# Patient Record
Sex: FEMALE | HISPANIC OR LATINO | Employment: OTHER | URBAN - METROPOLITAN AREA
[De-identification: names, ages, dates, MRNs, and addresses within clinical notes are randomized per-mention and may not be internally consistent; named-entity substitution may affect disease eponyms.]

---

## 2017-07-31 ENCOUNTER — HOSPITAL ENCOUNTER (EMERGENCY)
Facility: HOSPITAL | Age: 72
Discharge: HOME | End: 2017-07-31
Attending: EMERGENCY MEDICINE | Admitting: EMERGENCY MEDICINE

## 2017-07-31 VITALS
RESPIRATION RATE: 16 BRPM | WEIGHT: 144 LBS | OXYGEN SATURATION: 97 % | BODY MASS INDEX: 27.19 KG/M2 | DIASTOLIC BLOOD PRESSURE: 73 MMHG | HEART RATE: 87 BPM | TEMPERATURE: 98.1 F | SYSTOLIC BLOOD PRESSURE: 140 MMHG | HEIGHT: 61 IN

## 2017-07-31 DIAGNOSIS — Z86.73 HISTORY OF CVA (CEREBROVASCULAR ACCIDENT): Primary | ICD-10-CM

## 2017-07-31 LAB
ALBUMIN SERPL-MCNC: 4 G/DL (ref 3.5–5.2)
ALBUMIN/GLOB SERPL: 1 G/DL
ALP SERPL-CCNC: 79 U/L (ref 39–117)
ALT SERPL W P-5'-P-CCNC: 18 U/L (ref 1–33)
ANION GAP SERPL CALCULATED.3IONS-SCNC: 16.2 MMOL/L
AST SERPL-CCNC: 17 U/L (ref 1–32)
BASOPHILS # BLD AUTO: 0.03 10*3/MM3 (ref 0–0.2)
BASOPHILS NFR BLD AUTO: 0.3 % (ref 0–1.5)
BILIRUB SERPL-MCNC: 0.4 MG/DL (ref 0.1–1.2)
BUN BLD-MCNC: 14 MG/DL (ref 8–23)
BUN/CREAT SERPL: 17.5 (ref 7–25)
CALCIUM SPEC-SCNC: 9.8 MG/DL (ref 8.6–10.5)
CHLORIDE SERPL-SCNC: 98 MMOL/L (ref 98–107)
CO2 SERPL-SCNC: 25.8 MMOL/L (ref 22–29)
CREAT BLD-MCNC: 0.8 MG/DL (ref 0.57–1)
DEPRECATED RDW RBC AUTO: 50 FL (ref 37–54)
EOSINOPHIL # BLD AUTO: 0.04 10*3/MM3 (ref 0–0.7)
EOSINOPHIL NFR BLD AUTO: 0.4 % (ref 0.3–6.2)
ERYTHROCYTE [DISTWIDTH] IN BLOOD BY AUTOMATED COUNT: 14.2 % (ref 11.7–13)
GFR SERPL CREATININE-BSD FRML MDRD: 71 ML/MIN/1.73
GLOBULIN UR ELPH-MCNC: 4.1 GM/DL
GLUCOSE BLD-MCNC: 153 MG/DL (ref 65–99)
HCT VFR BLD AUTO: 39.7 % (ref 35.6–45.5)
HGB BLD-MCNC: 12.7 G/DL (ref 11.9–15.5)
HOLD SPECIMEN: NORMAL
HOLD SPECIMEN: NORMAL
IMM GRANULOCYTES # BLD: 0.03 10*3/MM3 (ref 0–0.03)
IMM GRANULOCYTES NFR BLD: 0.3 % (ref 0–0.5)
INR PPP: 1.41 (ref 0.9–1.1)
LYMPHOCYTES # BLD AUTO: 2.51 10*3/MM3 (ref 0.9–4.8)
LYMPHOCYTES NFR BLD AUTO: 25.3 % (ref 19.6–45.3)
MCH RBC QN AUTO: 31 PG (ref 26.9–32)
MCHC RBC AUTO-ENTMCNC: 32 G/DL (ref 32.4–36.3)
MCV RBC AUTO: 96.8 FL (ref 80.5–98.2)
MONOCYTES # BLD AUTO: 0.51 10*3/MM3 (ref 0.2–1.2)
MONOCYTES NFR BLD AUTO: 5.1 % (ref 5–12)
NEUTROPHILS # BLD AUTO: 6.8 10*3/MM3 (ref 1.9–8.1)
NEUTROPHILS NFR BLD AUTO: 68.6 % (ref 42.7–76)
PLATELET # BLD AUTO: 398 10*3/MM3 (ref 140–500)
PMV BLD AUTO: 12.1 FL (ref 6–12)
POTASSIUM BLD-SCNC: 3.5 MMOL/L (ref 3.5–5.2)
PROT SERPL-MCNC: 8.1 G/DL (ref 6–8.5)
PROTHROMBIN TIME: 16.7 SECONDS (ref 11.7–14.2)
RBC # BLD AUTO: 4.1 10*6/MM3 (ref 3.9–5.2)
SODIUM BLD-SCNC: 140 MMOL/L (ref 136–145)
WBC NRBC COR # BLD: 9.92 10*3/MM3 (ref 4.5–10.7)
WHOLE BLOOD HOLD SPECIMEN: NORMAL
WHOLE BLOOD HOLD SPECIMEN: NORMAL

## 2017-07-31 PROCEDURE — 85610 PROTHROMBIN TIME: CPT

## 2017-07-31 PROCEDURE — 85025 COMPLETE CBC W/AUTO DIFF WBC: CPT | Performed by: EMERGENCY MEDICINE

## 2017-07-31 PROCEDURE — 80053 COMPREHEN METABOLIC PANEL: CPT | Performed by: EMERGENCY MEDICINE

## 2017-07-31 PROCEDURE — 99284 EMERGENCY DEPT VISIT MOD MDM: CPT

## 2017-07-31 PROCEDURE — 36415 COLL VENOUS BLD VENIPUNCTURE: CPT

## 2017-07-31 RX ORDER — FUROSEMIDE 40 MG/1
40 TABLET ORAL 2 TIMES DAILY
COMMUNITY

## 2017-07-31 RX ORDER — CLOPIDOGREL BISULFATE 75 MG/1
75 TABLET ORAL DAILY
Qty: 30 TABLET | Refills: 0 | Status: SHIPPED | OUTPATIENT
Start: 2017-07-31 | End: 2017-08-31 | Stop reason: SDUPTHER

## 2017-07-31 RX ORDER — ATORVASTATIN CALCIUM 20 MG/1
20 TABLET, FILM COATED ORAL DAILY
COMMUNITY
End: 2017-08-21

## 2017-07-31 RX ORDER — SODIUM CHLORIDE 0.9 % (FLUSH) 0.9 %
10 SYRINGE (ML) INJECTION AS NEEDED
Status: DISCONTINUED | OUTPATIENT
Start: 2017-07-31 | End: 2017-07-31 | Stop reason: HOSPADM

## 2017-07-31 RX ORDER — PREDNISONE 20 MG/1
5 TABLET ORAL DAILY
COMMUNITY
End: 2017-08-14

## 2017-07-31 RX ORDER — ORPHENADRINE CITRATE 100 MG/1
100 TABLET, EXTENDED RELEASE ORAL 2 TIMES DAILY
COMMUNITY
End: 2017-08-14

## 2017-07-31 RX ORDER — LEVOTHYROXINE SODIUM 0.1 MG/1
100 TABLET ORAL DAILY
COMMUNITY
End: 2017-08-21

## 2017-07-31 RX ORDER — LOSARTAN POTASSIUM AND HYDROCHLOROTHIAZIDE 25; 100 MG/1; MG/1
1 TABLET ORAL DAILY
COMMUNITY

## 2017-08-14 ENCOUNTER — OFFICE VISIT (OUTPATIENT)
Dept: FAMILY MEDICINE CLINIC | Facility: CLINIC | Age: 72
End: 2017-08-14

## 2017-08-14 VITALS
SYSTOLIC BLOOD PRESSURE: 124 MMHG | TEMPERATURE: 97.7 F | BODY MASS INDEX: 26.28 KG/M2 | OXYGEN SATURATION: 98 % | HEART RATE: 89 BPM | HEIGHT: 61 IN | WEIGHT: 139.2 LBS | DIASTOLIC BLOOD PRESSURE: 78 MMHG

## 2017-08-14 DIAGNOSIS — G81.94 LEFT HEMIPARESIS (HCC): ICD-10-CM

## 2017-08-14 DIAGNOSIS — E03.9 HYPOTHYROIDISM, UNSPECIFIED TYPE: ICD-10-CM

## 2017-08-14 DIAGNOSIS — M81.0 OSTEOPOROSIS: ICD-10-CM

## 2017-08-14 DIAGNOSIS — Z78.0 POSTMENOPAUSAL: ICD-10-CM

## 2017-08-14 DIAGNOSIS — J84.9 INTERSTITIAL PNEUMONIA (HCC): Primary | ICD-10-CM

## 2017-08-14 DIAGNOSIS — I10 ESSENTIAL HYPERTENSION: ICD-10-CM

## 2017-08-14 DIAGNOSIS — S32.000S COMPRESSION FRACTURE OF LUMBAR VERTEBRA, SEQUELA: ICD-10-CM

## 2017-08-14 DIAGNOSIS — I63.9 CEREBROVASCULAR ACCIDENT (CVA), UNSPECIFIED MECHANISM (HCC): ICD-10-CM

## 2017-08-14 DIAGNOSIS — E11.9 DIABETES MELLITUS WITHOUT COMPLICATION (HCC): ICD-10-CM

## 2017-08-14 DIAGNOSIS — E78.5 HYPERLIPIDEMIA, UNSPECIFIED HYPERLIPIDEMIA TYPE: ICD-10-CM

## 2017-08-14 PROBLEM — S32.000A COMPRESSION FRACTURE OF LUMBAR VERTEBRA (HCC): Status: ACTIVE | Noted: 2017-08-14

## 2017-08-14 PROCEDURE — 99204 OFFICE O/P NEW MOD 45 MIN: CPT | Performed by: NURSE PRACTITIONER

## 2017-08-14 RX ORDER — PREDNISONE 1 MG/1
5 TABLET ORAL DAILY
Qty: 30 TABLET | Refills: 1 | Status: SHIPPED | OUTPATIENT
Start: 2017-08-14

## 2017-08-14 RX ORDER — LIDOCAINE 50 MG/G
1 PATCH TOPICAL EVERY 24 HOURS
Qty: 30 PATCH | Refills: 5 | Status: SHIPPED | OUTPATIENT
Start: 2017-08-14 | End: 2017-08-21

## 2017-08-14 RX ORDER — ALENDRONATE SODIUM 70 MG/1
70 TABLET ORAL
Qty: 4 TABLET | Refills: 5
Start: 2017-08-14

## 2017-08-14 NOTE — PROGRESS NOTES
"Subjective   Shyla Garrison is a 71 y.o. female.     History of Present Illness     New pt.  She is from [place], speaks no English.  Here with her daughter who serves as .    [place]  Daughter went to [place] and brought her back to [place] to live with her.  Pt was seen at [facility] ED on 7/31 with c/o's weakness, chest pain and weight loss.  Neg w/u.  They did d/c the warfarin she was on and changed her to Plavix.  Daughter states pt had PE in [place].  But, now daughter is unsure if pt really had any PEs.  She was neg for DVT.  She does not have A-fib.  Appt schedule with neuro in December with Hao. Daughter wants neurologist [facility]  Daughter has been working from home to take care of her mother.  Needs [facility] paperwork completed so she can care for patient and transport to/from medical appts.  Pt has left sided weakness.  Deficit is improving.  But, still requires full care at home and has difficulty leaving home.      Hx interstitial pneumonia.  Has been on prednisone for 2 years.  A month ago dose decreased from 20 mg daily to 5 mg daily.  She has tolerated change.  Never required home O2.  Daughter states pt does fatigue easily with activities.    Hx DM since taking daily prednisone.  Controlled with metformin.      HLD:  Has been on atorvastatin for some time.  No fasting today.      Osteoporosis:  Hasn't taken her alendronate in about 2 months.  Hx compression fx L3 which was just identified on x-ray during hospitalization [place]  Pt has c/o'd of back pain x 2 years and was \"addicted to pain medications\" according to her daughter.  Daughter has made sure pt is not going to get future prescriptions of narcotics.  Pt is using lidocaine patches for the pain.      HTN:  Controlled with losartain-HCTZ and furoxemide.      Hypothyroidism:  Currently taking levothyroxine 100 mcg daily.      Mammo is UTD--2/15/17-neg.  ? Date of last " dexascan.      The following portions of the patient's history were reviewed and updated as appropriate: allergies, current medications, past family history, past medical history, past social history, past surgical history and problem list.    Review of Systems   Respiratory: Positive for shortness of breath.    Cardiovascular: Negative.    Endocrine: Negative.    Musculoskeletal: Positive for back pain and gait problem.        Per HPI   Neurological: Positive for numbness.        Per HPI   All other systems reviewed and are negative.      Objective   Physical Exam   Constitutional: Vital signs are normal. She appears well-developed and well-nourished.   Cardiovascular: Normal rate, regular rhythm, S1 normal, S2 normal and normal heart sounds.    No murmur heard.  Pulmonary/Chest: Effort normal and breath sounds normal.   Neurological: She is alert.   Psychiatric: She has a normal mood and affect.   Nursing note and vitals reviewed.      Assessment/Plan   Shyla was seen today for establish care.    Diagnoses and all orders for this visit:    Interstitial pneumonia  -     Ambulatory Referral to Pulmonology  -     CBC & Differential; Future  -     Lipid Panel With / Chol / HDL Ratio; Future  -     Comprehensive Metabolic Panel; Future  -     TSH; Future  -     T4, Free; Future  -     Hemoglobin A1c; Future  -     MicroAlbumin, Urine, Random; Future  -     Vitamin D 25 Hydroxy; Future    Cerebrovascular accident (CVA), unspecified mechanism  -     Ambulatory Referral to Home Health  -     Ambulatory Referral to Neurology  -     CBC & Differential; Future  -     Lipid Panel With / Chol / HDL Ratio; Future  -     Comprehensive Metabolic Panel; Future  -     TSH; Future  -     T4, Free; Future  -     Hemoglobin A1c; Future  -     MicroAlbumin, Urine, Random; Future  -     Vitamin D 25 Hydroxy; Future    Left hemiparesis  -     Ambulatory Referral to Home Health  -     Ambulatory Referral to Neurology  -     CBC &  Differential; Future  -     Lipid Panel With / Chol / HDL Ratio; Future  -     Comprehensive Metabolic Panel; Future  -     TSH; Future  -     T4, Free; Future  -     Hemoglobin A1c; Future  -     MicroAlbumin, Urine, Random; Future  -     Vitamin D 25 Hydroxy; Future    Hypothyroidism, unspecified type  -     CBC & Differential; Future  -     Lipid Panel With / Chol / HDL Ratio; Future  -     Comprehensive Metabolic Panel; Future  -     TSH; Future  -     T4, Free; Future  -     Hemoglobin A1c; Future  -     MicroAlbumin, Urine, Random; Future  -     Vitamin D 25 Hydroxy; Future    Essential hypertension  -     CBC & Differential; Future  -     Lipid Panel With / Chol / HDL Ratio; Future  -     Comprehensive Metabolic Panel; Future  -     TSH; Future  -     T4, Free; Future  -     Hemoglobin A1c; Future  -     MicroAlbumin, Urine, Random; Future  -     Vitamin D 25 Hydroxy; Future    Hyperlipidemia, unspecified hyperlipidemia type  -     CBC & Differential; Future  -     Lipid Panel With / Chol / HDL Ratio; Future  -     Comprehensive Metabolic Panel; Future  -     TSH; Future  -     T4, Free; Future  -     Hemoglobin A1c; Future  -     MicroAlbumin, Urine, Random; Future  -     Vitamin D 25 Hydroxy; Future    Diabetes mellitus without complication  -     CBC & Differential; Future  -     Lipid Panel With / Chol / HDL Ratio; Future  -     Comprehensive Metabolic Panel; Future  -     TSH; Future  -     T4, Free; Future  -     Hemoglobin A1c; Future  -     MicroAlbumin, Urine, Random; Future  -     Vitamin D 25 Hydroxy; Future    Osteoporosis  -     Vitamin D 25 Hydroxy; Future    Postmenopausal  -     Vitamin D 25 Hydroxy; Future    Compression fracture of lumbar vertebra, sequela  -     Vitamin D 25 Hydroxy; Future    Other orders  -     predniSONE (DELTASONE) 5 MG tablet; Take 1 tablet by mouth Daily.  -     alendronate (FOSAMAX) 70 MG tablet; Take 1 tablet by mouth Every 7 (Seven) Days.  -     lidocaine  (LIDODERM) 5 %; Place 1 patch on the skin Daily. Remove & Discard patch within 12 hours or as directed by NP      fbw tomorrow.    RTC one week.

## 2017-08-15 ENCOUNTER — RESULTS ENCOUNTER (OUTPATIENT)
Dept: FAMILY MEDICINE CLINIC | Facility: CLINIC | Age: 72
End: 2017-08-15

## 2017-08-15 ENCOUNTER — TELEPHONE (OUTPATIENT)
Dept: FAMILY MEDICINE CLINIC | Facility: CLINIC | Age: 72
End: 2017-08-15

## 2017-08-15 DIAGNOSIS — M81.0 OSTEOPOROSIS: ICD-10-CM

## 2017-08-15 DIAGNOSIS — E11.9 DIABETES MELLITUS WITHOUT COMPLICATION (HCC): ICD-10-CM

## 2017-08-15 DIAGNOSIS — Z78.0 POSTMENOPAUSAL: ICD-10-CM

## 2017-08-15 DIAGNOSIS — S32.000S COMPRESSION FRACTURE OF LUMBAR VERTEBRA, SEQUELA: ICD-10-CM

## 2017-08-15 DIAGNOSIS — G81.94 LEFT HEMIPARESIS (HCC): ICD-10-CM

## 2017-08-15 DIAGNOSIS — E78.5 HYPERLIPIDEMIA, UNSPECIFIED HYPERLIPIDEMIA TYPE: ICD-10-CM

## 2017-08-15 DIAGNOSIS — I10 ESSENTIAL HYPERTENSION: ICD-10-CM

## 2017-08-15 DIAGNOSIS — J84.9 INTERSTITIAL PNEUMONIA (HCC): ICD-10-CM

## 2017-08-15 DIAGNOSIS — E03.9 HYPOTHYROIDISM, UNSPECIFIED TYPE: ICD-10-CM

## 2017-08-15 DIAGNOSIS — I63.9 CEREBROVASCULAR ACCIDENT (CVA), UNSPECIFIED MECHANISM (HCC): ICD-10-CM

## 2017-08-15 NOTE — TELEPHONE ENCOUNTER
----- Message from SUSANA Kim sent at 8/15/2017 11:55 AM EDT -----  Regarding: RE: [facility]   Please call pt's daughter and inform her of this.  I advise patient to go to outpatient PT/OT.  Insurance will probably cover that.  Daughter should be able to take patient to her PT/OT appts since she is working out of home and I am completely FMLA paperwork for her to be able to do this.  Let me know if she is okay with outpatient PT/OT and I will order.  She can get therapy at [facility] which is right by their home.        ----- Message -----     From: Janes Jones MA     Sent: 8/15/2017   9:40 AM       To: SUSANA Kim  Subject: [facility] left a message regarding patient Shyla Sutton. Their agency is not in network with patients insurance.Patient will have to pay full price unless [employer] clears her [place]. They wanted to inform you they went on and cancelled the referral based on the cost.

## 2017-08-15 NOTE — TELEPHONE ENCOUNTER
----- Message from SUSANA Kim sent at 8/15/2017 11:55 AM EDT -----  Regarding: RE: [facility]   Please call pt's daughter and inform her of this.  I advise patient to go to outpatient PT/OT.  Insurance will probably cover that.  Daughter should be able to take patient to her PT/OT appts since she is working out of home and I am completely FMLA paperwork for her to be able to do this.  Let me know if she is okay with outpatient PT/OT and I will order.  She can get therapy at [facility] which is right by their home.        ----- Message -----     From: Janes Jones MA     Sent: 8/15/2017   9:40 AM       To: SUSANA Kim  Subject: [facility] left a message regarding patient Shyla Sutton. Their agency is not in network with patients insurance.Patient will have to pay full price unless [employer] clears her since [place]. They wanted to inform you they went on and cancelled the referral based on the cost.

## 2017-08-16 DIAGNOSIS — I63.9 CEREBROVASCULAR ACCIDENT (CVA), UNSPECIFIED MECHANISM (HCC): Primary | ICD-10-CM

## 2017-08-16 DIAGNOSIS — G81.94 LEFT HEMIPARESIS (HCC): ICD-10-CM

## 2017-08-16 LAB
25(OH)D3+25(OH)D2 SERPL-MCNC: 35.7 NG/ML (ref 30–100)
ALBUMIN SERPL-MCNC: 4.1 G/DL (ref 3.5–5.2)
ALBUMIN/GLOB SERPL: 1.3 G/DL
ALP SERPL-CCNC: 72 U/L (ref 39–117)
ALT SERPL-CCNC: 15 U/L (ref 1–33)
AST SERPL-CCNC: 16 U/L (ref 1–32)
BASOPHILS # BLD AUTO: 0.05 10*3/MM3 (ref 0–0.2)
BASOPHILS NFR BLD AUTO: 0.4 % (ref 0–1.5)
BILIRUB SERPL-MCNC: 0.4 MG/DL (ref 0.1–1.2)
BUN SERPL-MCNC: 17 MG/DL (ref 8–23)
BUN/CREAT SERPL: 17.2 (ref 7–25)
CALCIUM SERPL-MCNC: 10.3 MG/DL (ref 8.6–10.5)
CHLORIDE SERPL-SCNC: 102 MMOL/L (ref 98–107)
CHOLEST SERPL-MCNC: 149 MG/DL (ref 0–200)
CHOLEST/HDLC SERPL: 3.92 {RATIO}
CO2 SERPL-SCNC: 28.2 MMOL/L (ref 22–29)
CREAT SERPL-MCNC: 0.99 MG/DL (ref 0.57–1)
EOSINOPHIL # BLD AUTO: 0.19 10*3/MM3 (ref 0–0.7)
EOSINOPHIL NFR BLD AUTO: 1.6 % (ref 0.3–6.2)
ERYTHROCYTE [DISTWIDTH] IN BLOOD BY AUTOMATED COUNT: 15 % (ref 11.7–13)
GLOBULIN SER CALC-MCNC: 3.1 GM/DL
GLUCOSE SERPL-MCNC: 141 MG/DL (ref 65–99)
HBA1C MFR BLD: 7.96 % (ref 4.8–5.6)
HCT VFR BLD AUTO: 40.5 % (ref 35.6–45.5)
HDLC SERPL-MCNC: 38 MG/DL (ref 40–60)
HGB BLD-MCNC: 12.5 G/DL (ref 11.9–15.5)
IMM GRANULOCYTES # BLD: 0.04 10*3/MM3 (ref 0–0.03)
IMM GRANULOCYTES NFR BLD: 0.3 % (ref 0–0.5)
LDLC SERPL CALC-MCNC: 58 MG/DL (ref 0–100)
LYMPHOCYTES # BLD AUTO: 4.44 10*3/MM3 (ref 0.9–4.8)
LYMPHOCYTES NFR BLD AUTO: 36.7 % (ref 19.6–45.3)
MCH RBC QN AUTO: 30.8 PG (ref 26.9–32)
MCHC RBC AUTO-ENTMCNC: 30.9 G/DL (ref 32.4–36.3)
MCV RBC AUTO: 99.8 FL (ref 80.5–98.2)
MICROALBUMIN UR-MCNC: 4.1 UG/ML
MONOCYTES # BLD AUTO: 0.99 10*3/MM3 (ref 0.2–1.2)
MONOCYTES NFR BLD AUTO: 8.2 % (ref 5–12)
NEUTROPHILS # BLD AUTO: 6.4 10*3/MM3 (ref 1.9–8.1)
NEUTROPHILS NFR BLD AUTO: 52.8 % (ref 42.7–76)
PLATELET # BLD AUTO: 218 10*3/MM3 (ref 140–500)
POTASSIUM SERPL-SCNC: 4.8 MMOL/L (ref 3.5–5.2)
PROT SERPL-MCNC: 7.2 G/DL (ref 6–8.5)
RBC # BLD AUTO: 4.06 10*6/MM3 (ref 3.9–5.2)
SODIUM SERPL-SCNC: 146 MMOL/L (ref 136–145)
T4 FREE SERPL-MCNC: 2.08 NG/DL (ref 0.93–1.7)
TRIGL SERPL-MCNC: 265 MG/DL (ref 0–150)
TSH SERPL DL<=0.005 MIU/L-ACNC: 0.21 MIU/ML (ref 0.27–4.2)
VLDLC SERPL CALC-MCNC: 53 MG/DL (ref 5–40)
WBC # BLD AUTO: 12.11 10*3/MM3 (ref 4.5–10.7)

## 2017-08-16 NOTE — PROGRESS NOTES
Call pt's daughter Clara.  Pt has abnormal labs that require med changes.  It is important that she keep appt on 8/21.

## 2017-08-18 ENCOUNTER — TELEPHONE (OUTPATIENT)
Dept: FAMILY MEDICINE CLINIC | Facility: CLINIC | Age: 72
End: 2017-08-18

## 2017-08-21 ENCOUNTER — OFFICE VISIT (OUTPATIENT)
Dept: FAMILY MEDICINE CLINIC | Facility: CLINIC | Age: 72
End: 2017-08-21

## 2017-08-21 VITALS
WEIGHT: 138.6 LBS | OXYGEN SATURATION: 98 % | DIASTOLIC BLOOD PRESSURE: 82 MMHG | HEART RATE: 89 BPM | SYSTOLIC BLOOD PRESSURE: 124 MMHG | TEMPERATURE: 98 F | HEIGHT: 61 IN | BODY MASS INDEX: 26.17 KG/M2

## 2017-08-21 DIAGNOSIS — M81.0 OSTEOPOROSIS: ICD-10-CM

## 2017-08-21 DIAGNOSIS — E78.2 MIXED HYPERLIPIDEMIA: ICD-10-CM

## 2017-08-21 DIAGNOSIS — E03.8 OTHER SPECIFIED HYPOTHYROIDISM: ICD-10-CM

## 2017-08-21 DIAGNOSIS — E11.9 DIABETES MELLITUS WITHOUT COMPLICATION (HCC): ICD-10-CM

## 2017-08-21 DIAGNOSIS — I10 ESSENTIAL HYPERTENSION: Primary | ICD-10-CM

## 2017-08-21 DIAGNOSIS — K21.9 GASTROESOPHAGEAL REFLUX DISEASE WITHOUT ESOPHAGITIS: ICD-10-CM

## 2017-08-21 DIAGNOSIS — I63.9 CEREBROVASCULAR ACCIDENT (CVA), UNSPECIFIED MECHANISM (HCC): ICD-10-CM

## 2017-08-21 DIAGNOSIS — S32.000S COMPRESSION FRACTURE OF LUMBAR VERTEBRA, SEQUELA: ICD-10-CM

## 2017-08-21 PROCEDURE — 99214 OFFICE O/P EST MOD 30 MIN: CPT | Performed by: NURSE PRACTITIONER

## 2017-08-21 RX ORDER — RANITIDINE 150 MG/1
150 CAPSULE ORAL DAILY
Qty: 30 CAPSULE | Refills: 5 | Status: SHIPPED | OUTPATIENT
Start: 2017-08-21 | End: 2018-08-21

## 2017-08-21 RX ORDER — ATORVASTATIN CALCIUM 40 MG/1
40 TABLET, FILM COATED ORAL NIGHTLY
Qty: 30 TABLET | Refills: 3 | Status: SHIPPED | OUTPATIENT
Start: 2017-08-21

## 2017-08-21 RX ORDER — TRAMADOL HYDROCHLORIDE 50 MG/1
50 TABLET ORAL EVERY 6 HOURS PRN
Qty: 60 TABLET | Refills: 1 | Status: SHIPPED | OUTPATIENT
Start: 2017-08-21

## 2017-08-21 RX ORDER — LEVOTHYROXINE SODIUM 88 UG/1
88 TABLET ORAL DAILY
Qty: 30 TABLET | Refills: 1 | Status: SHIPPED | OUTPATIENT
Start: 2017-08-21

## 2017-08-31 RX ORDER — CLOPIDOGREL BISULFATE 75 MG/1
75 TABLET ORAL DAILY
Qty: 30 TABLET | Refills: 6 | Status: SHIPPED | OUTPATIENT
Start: 2017-08-31

## 2017-09-26 ENCOUNTER — TELEPHONE (OUTPATIENT)
Dept: FAMILY MEDICINE CLINIC | Facility: CLINIC | Age: 72
End: 2017-09-26

## 2017-09-26 NOTE — TELEPHONE ENCOUNTER
PATIENT HAS BEEN RESCHEDULED TO 10/19 PER CHAYITO EMANUEL. SHE DOES NOT WANT PATIENTS GETTING LABS WHILE SHE IS OUT OF OFFICE

## 2017-09-29 DIAGNOSIS — F43.21 GRIEF REACTION: Primary | ICD-10-CM

## 2017-09-29 RX ORDER — LORAZEPAM 0.5 MG/1
TABLET ORAL
Qty: 20 TABLET | Refills: 0 | Status: SHIPPED | OUTPATIENT
Start: 2017-09-29

## 2017-10-02 ENCOUNTER — RESULTS ENCOUNTER (OUTPATIENT)
Dept: FAMILY MEDICINE CLINIC | Facility: CLINIC | Age: 72
End: 2017-10-02

## 2017-10-02 DIAGNOSIS — E03.8 OTHER SPECIFIED HYPOTHYROIDISM: ICD-10-CM

## 2017-10-23 ENCOUNTER — TELEPHONE (OUTPATIENT)
Dept: FAMILY MEDICINE CLINIC | Facility: CLINIC | Age: 72
End: 2017-10-23

## 2017-10-23 NOTE — TELEPHONE ENCOUNTER
Patients daughter had me cancel the appt. Her mother is leaving for [place] on 11/5 and has no idea when she will be back.

## 2017-10-23 NOTE — TELEPHONE ENCOUNTER
----- Message from SUSANA Kim sent at 10/23/2017 12:49 PM EDT -----  Regarding: RE: Medication too expensive  We can discuss at her appt on 11/16  ----- Message -----     From: Janes Jones MA     Sent: 10/23/2017  11:54 AM       To: SUSANA Kim  Subject: Medication too expensive                         Patients daughter called. Her mothers Sitagliptin-metformin is too expensive for them. They do not have insurance. She wants to know if there is something else you can give her.

## 2022-05-18 NOTE — TELEPHONE ENCOUNTER
----- Message from SUSANA Kim sent at 10/23/2017  2:31 PM EDT -----  Regarding: RE: Medication too expensive  Call pt's daughter.  I sent a prescription to patient's pharmacy for metformin 500 mg twice a day with meals.  I do not know what it will cost.  But, this is the least expensive medication available for her mother.            ----- Message -----     From: Janes Jones MA     Sent: 10/23/2017   1:52 PM       To: SUSANA Kim  Subject: RE: Medication too expensive                     Patients daughter had me cancel the appt. Her mother is leaving for [place] on 11/5 and has no idea when she will be back. Her daughter is pretty upset sounding. She says her mother needs medication. She did not reschedule because she doesn't know when her mother will return. She says Shyla has been taking metformin and she is out of that now.     ----- Message -----     From: SUSANA Kim     Sent: 10/23/2017  12:49 PM       To: Janes Jones MA  Subject: RE: Medication too expensive                     We can discuss at her appt on 11/16  ----- Message -----     From: Janes Jones MA     Sent: 10/23/2017  11:54 AM       To: SUSANA Kim  Subject: Medication too expensive                         Patients daughter called. Her mothers Sitagliptin-metformin is too expensive for them. They do not have insurance. She wants to know if there is something else you can give her.         This was a shared visit with the CATE. I reviewed and verified the documentation and independently performed the documented:
